# Patient Record
Sex: FEMALE | Race: WHITE | URBAN - METROPOLITAN AREA
[De-identification: names, ages, dates, MRNs, and addresses within clinical notes are randomized per-mention and may not be internally consistent; named-entity substitution may affect disease eponyms.]

---

## 2017-06-20 ENCOUNTER — TRANSFERRED RECORDS (OUTPATIENT)
Dept: HEALTH INFORMATION MANAGEMENT | Facility: CLINIC | Age: 37
End: 2017-06-20

## 2017-06-20 LAB
HPV ABSTRACT: NORMAL
HPV ABSTRACT: NORMAL
PAP-ABSTRACT: NORMAL

## 2018-01-15 ENCOUNTER — OFFICE VISIT (OUTPATIENT)
Dept: FAMILY MEDICINE | Facility: CLINIC | Age: 38
End: 2018-01-15
Payer: COMMERCIAL

## 2018-01-15 VITALS
TEMPERATURE: 98.9 F | RESPIRATION RATE: 16 BRPM | OXYGEN SATURATION: 98 % | SYSTOLIC BLOOD PRESSURE: 124 MMHG | BODY MASS INDEX: 32.45 KG/M2 | HEIGHT: 66 IN | DIASTOLIC BLOOD PRESSURE: 80 MMHG | HEART RATE: 96 BPM | WEIGHT: 201.9 LBS

## 2018-01-15 DIAGNOSIS — R30.0 DYSURIA: Primary | ICD-10-CM

## 2018-01-15 DIAGNOSIS — Z11.3 SCREENING EXAMINATION FOR VENEREAL DISEASE: ICD-10-CM

## 2018-01-15 LAB
ALBUMIN UR-MCNC: NEGATIVE MG/DL
APPEARANCE UR: CLEAR
BACTERIA #/AREA URNS HPF: ABNORMAL /HPF
BILIRUB UR QL STRIP: NEGATIVE
COLOR UR AUTO: YELLOW
GLUCOSE UR STRIP-MCNC: NEGATIVE MG/DL
HGB UR QL STRIP: ABNORMAL
KETONES UR STRIP-MCNC: NEGATIVE MG/DL
LEUKOCYTE ESTERASE UR QL STRIP: NEGATIVE
NITRATE UR QL: NEGATIVE
NON-SQ EPI CELLS #/AREA URNS LPF: ABNORMAL /LPF
PH UR STRIP: 7 PH (ref 5–7)
RBC #/AREA URNS AUTO: ABNORMAL /HPF
SOURCE: ABNORMAL
SP GR UR STRIP: 1.01 (ref 1–1.03)
SPECIMEN SOURCE: NORMAL
UROBILINOGEN UR STRIP-ACNC: 0.2 EU/DL (ref 0.2–1)
WBC #/AREA URNS AUTO: ABNORMAL /HPF
WET PREP SPEC: NORMAL

## 2018-01-15 PROCEDURE — 81001 URINALYSIS AUTO W/SCOPE: CPT | Performed by: PHYSICIAN ASSISTANT

## 2018-01-15 PROCEDURE — 87591 N.GONORRHOEAE DNA AMP PROB: CPT | Performed by: PHYSICIAN ASSISTANT

## 2018-01-15 PROCEDURE — 87389 HIV-1 AG W/HIV-1&-2 AB AG IA: CPT | Performed by: PHYSICIAN ASSISTANT

## 2018-01-15 PROCEDURE — 86780 TREPONEMA PALLIDUM: CPT | Performed by: PHYSICIAN ASSISTANT

## 2018-01-15 PROCEDURE — 87210 SMEAR WET MOUNT SALINE/INK: CPT | Performed by: PHYSICIAN ASSISTANT

## 2018-01-15 PROCEDURE — 99213 OFFICE O/P EST LOW 20 MIN: CPT | Performed by: PHYSICIAN ASSISTANT

## 2018-01-15 PROCEDURE — 36415 COLL VENOUS BLD VENIPUNCTURE: CPT | Performed by: PHYSICIAN ASSISTANT

## 2018-01-15 PROCEDURE — 87491 CHLMYD TRACH DNA AMP PROBE: CPT | Performed by: PHYSICIAN ASSISTANT

## 2018-01-15 RX ORDER — LAMOTRIGINE 200 MG/1
TABLET ORAL
Refills: 5 | COMMUNITY
Start: 2018-01-03

## 2018-01-15 RX ORDER — TOPIRAMATE 25 MG/1
50 TABLET, FILM COATED ORAL
COMMUNITY
Start: 2017-12-27

## 2018-01-15 RX ORDER — ERGOCALCIFEROL 1.25 MG/1
CAPSULE, LIQUID FILLED ORAL
COMMUNITY
Start: 2017-11-08

## 2018-01-15 RX ORDER — VENLAFAXINE HYDROCHLORIDE 150 MG/1
150 CAPSULE, EXTENDED RELEASE ORAL
COMMUNITY
Start: 2017-06-22

## 2018-01-15 RX ORDER — ALPRAZOLAM 0.5 MG
TABLET ORAL
COMMUNITY
Start: 2017-10-20

## 2018-01-15 ASSESSMENT — PATIENT HEALTH QUESTIONNAIRE - PHQ9
SUM OF ALL RESPONSES TO PHQ QUESTIONS 1-9: 15
SUM OF ALL RESPONSES TO PHQ QUESTIONS 1-9: 15
10. IF YOU CHECKED OFF ANY PROBLEMS, HOW DIFFICULT HAVE THESE PROBLEMS MADE IT FOR YOU TO DO YOUR WORK, TAKE CARE OF THINGS AT HOME, OR GET ALONG WITH OTHER PEOPLE: VERY DIFFICULT

## 2018-01-15 ASSESSMENT — ANXIETY QUESTIONNAIRES
GAD7 TOTAL SCORE: 7
4. TROUBLE RELAXING: NOT AT ALL
1. FEELING NERVOUS, ANXIOUS, OR ON EDGE: MORE THAN HALF THE DAYS
2. NOT BEING ABLE TO STOP OR CONTROL WORRYING: SEVERAL DAYS
5. BEING SO RESTLESS THAT IT IS HARD TO SIT STILL: NOT AT ALL
6. BECOMING EASILY ANNOYED OR IRRITABLE: NEARLY EVERY DAY
7. FEELING AFRAID AS IF SOMETHING AWFUL MIGHT HAPPEN: NOT AT ALL
3. WORRYING TOO MUCH ABOUT DIFFERENT THINGS: SEVERAL DAYS
7. FEELING AFRAID AS IF SOMETHING AWFUL MIGHT HAPPEN: NOT AT ALL
GAD7 TOTAL SCORE: 7
GAD7 TOTAL SCORE: 7

## 2018-01-15 ASSESSMENT — PAIN SCALES - GENERAL: PAINLEVEL: NO PAIN (0)

## 2018-01-15 NOTE — PROGRESS NOTES
"  SUBJECTIVE:                                                    Christina Mcmahon is a 37 year old female who presents to clinic today for the following health issues:          History of Present Illness   Frequency of exercise:  1 day/week  Duration of exercise:  15-30 minutes  Taking medications regularly:  Yes  Medication side effects:  None  Additional concerns today:  No        STD Check , Patient states she has no symptoms,  but her partner had dysuria. She also stated she had chlamydia twice in the past.     New male partner with for 1 month. She had negative STD testing prior to this partner. He has had dysuria- he has not been seen. She wanted to get checked b/c has had chlamydia in the past- 3 yrs ago and 2.5yrs ago.   Not using condoms.   Contraception- mirena. No LMP recorded. Patient is not currently having periods (Reason: IUD). never any bleeding or spotting.   No vaginal discharge.  No pain with intercourse or pelvic pain.   No sores in the genital area, lumps, bumps.   \"Little bit\" of dysuria at start of urine stream. But has had a few kids and attributes to that. Some frequency of urination.   At times some urinary incontinence with    Pap at [facility]- pt states is up to date.   No recent abx. Pt states not prone to BV or yeast.       Problem list and histories reviewed & adjusted, as indicated.  Additional history: as documented      Patient Active Problem List   Diagnosis     Sphincter of Oddi dysfunction     History reviewed. No pertinent surgical history.    Social History   Substance Use Topics     Smoking status: Current Some Day Smoker     Smokeless tobacco: Never Used      Comment: 1-3 cigs daily      Alcohol use Yes      Comment: socail      Family History   Problem Relation Age of Onset     Hypertension Father          Current Outpatient Prescriptions   Medication Sig Dispense Refill     ALPRAZolam (XANAX) 0.5 MG tablet as needed.       lamoTRIgine (LAMICTAL) 200 MG tablet TAKE 1 TABLET " "DAILY.  5     vitamin D (ERGOCALCIFEROL) 94250 UNIT capsule Take one capsule once a week for 8 weeks.       aspirin-acetaminophen-caffeine (EXCEDRIN MIGRAINE) 250-250-65 MG per tablet Take 1 tablet by mouth       metFORMIN (GLUCOPHAGE) 500 MG tablet TAKE 1 TABLET BY MOUTH 2 TIMES DAILY WITH MEALS.  0     venlafaxine (EFFEXOR-XR) 150 MG 24 hr capsule Take 150 mg by mouth       topiramate (TOPAMAX) 25 MG tablet Take 50 mg by mouth       Acetaminophen (TYLENOL PO)        HYDROcodone-acetaminophen (NORCO) 5-325 MG per tablet Take 1 tablet by mouth every 6 hours as needed for moderate to severe pain (Patient not taking: Reported on 1/15/2018) 15 tablet 0     FLUOXETINE HCL PO        Allergies   Allergen Reactions     Codeine Nausea and Vomiting     BP Readings from Last 3 Encounters:   01/15/18 124/80   05/03/15 (!) 137/95   06/17/12 124/85    Wt Readings from Last 3 Encounters:   01/15/18 201 lb 14.4 oz (91.6 kg)   10/14/14 189 lb (85.7 kg)                  Labs reviewed in EPIC        ROS:  Constitutional, HEENT, cardiovascular, pulmonary, gi and gu systems are negative, except as otherwise noted.      OBJECTIVE:   /80  Pulse 96  Temp 98.9  F (37.2  C) (Temporal)  Resp 16  Ht 5' 6\" (1.676 m)  Wt 201 lb 14.4 oz (91.6 kg)  SpO2 98%  BMI 32.59 kg/m2  Body mass index is 32.59 kg/(m^2).  GENERAL: healthy, alert and no distress  EYES: Eyes grossly normal to inspection, PERRL and conjunctivae and sclerae normal  NECK: no adenopathy, no asymmetry, masses, or scars and thyroid normal to palpation  RESP: lungs clear to auscultation - no rales, rhonchi or wheezes  CV: regular rate and rhythm, normal S1 S2, no S3 or S4, no murmur, click or rub  GI: +BS, soft nontender, no suprapubic tenderness, no CVA TTP, no guarding or rebound  : pt deferred  NEURO: Normal strength and tone, mentation intact and speech normal  PSYCH: mentation appears normal, affect normal/bright    Diagnostic Test Results:  Results for orders " placed or performed in visit on 01/15/18   UA reflex to Microscopic   Result Value Ref Range    Color Urine Yellow     Appearance Urine Clear     Glucose Urine Negative NEG^Negative mg/dL    Bilirubin Urine Negative NEG^Negative    Ketones Urine Negative NEG^Negative mg/dL    Specific Gravity Urine 1.015 1.003 - 1.035    Blood Urine Trace (A) NEG^Negative    pH Urine 7.0 5.0 - 7.0 pH    Protein Albumin Urine Negative NEG^Negative mg/dL    Urobilinogen Urine 0.2 0.2 - 1.0 EU/dL    Nitrite Urine Negative NEG^Negative    Leukocyte Esterase Urine Negative NEG^Negative    Source Midstream Urine    HIV Antigen Antibody Combo   Result Value Ref Range    HIV Antigen Antibody Combo Nonreactive NR^Nonreactive       Anti Treponema   Result Value Ref Range    Treponema pallidum Antibody Negative NEG^Negative   Urine Microscopic   Result Value Ref Range    WBC Urine O - 2 OTO2^O - 2 /HPF    RBC Urine O - 2 OTO2^O - 2 /HPF    Squamous Epithelial /LPF Urine Few FEW^Few /LPF    Bacteria Urine Few (A) NEG^Negative /HPF   Wet prep   Result Value Ref Range    Specimen Description Vagina     Wet Prep No yeast seen     Wet Prep No Trichomonas seen     Wet Prep No clue cells seen    Chlamydia trachomatis PCR   Result Value Ref Range    Specimen Description Vagina     Chlamydia Trachomatis PCR Negative NEG^Negative   Neisseria gonorrhoeae PCR   Result Value Ref Range    Specimen Descrip Vagina     N Gonorrhea PCR Negative NEG^Negative         ASSESSMENT/PLAN:       1. Dysuria  Pt not concerned for UTI, but UA w/micro obtained clean catch.   Self-swab for wet prep and CG/chlamydia.   Push fluids, follow up if worsening UTI like sx.   - Wet prep  - UA reflex to Microscopic  - Urine Microscopic    2. Screening examination for venereal disease  Safe sex practices discussed and encouraged. STD screening as below. Encourage partner to have testing since he is reporting sx.   - Chlamydia trachomatis PCR  - Neisseria gonorrhoeae PCR  - HIV  Antigen Antibody Combo  - Anti Treponema    Follow Up: For worsening symptoms (ie new fevers, worsening pain, etc), non-improvement as expected/discussed, questions regarding your medications or treatment plan. Discussed parameters for follow up and included in After Visit Summary given to patient.      Aysha Noriega PA-C  [facility]

## 2018-01-15 NOTE — PATIENT INSTRUCTIONS
I will call you with results.     Chlamydia, gonorrhea, HIV, syphilis, wet prep (vaginal swab for yeast/bacteria) and urine to eval for UTI

## 2018-01-15 NOTE — MR AVS SNAPSHOT
"              After Visit Summary   1/15/2018    Christina Mcmahon    MRN: [id number]           Patient Information     Date Of Birth          1980        Visit Information        Provider Department      1/15/2018 10:40 AM Aysha Noriega PA-C [facility]        Today's Diagnoses     Dysuria    -  1    Screening examination for venereal disease          Care Instructions    I will call you with results.     Chlamydia, gonorrhea, HIV, syphilis, wet prep (vaginal swab for yeast/bacteria) and urine to eval for UTI           Follow-ups after your visit        Your next 10 appointments already scheduled     Mar 15, 2018  9:00 AM CDT   Return Visit with Christina Ortiz MD   [facility] ([facility])    [id number] [place] [id number] [phone]              Who to contact     If you have questions or need follow up information about today's clinic visit or your schedule please contact [facility] directly at [phone].  Normal or non-critical lab and imaging results will be communicated to you by Chore[employer]hart, letter or phone within 4 business days after the clinic has received the results. If you do not hear from us within 7 days, please contact the clinic through Chore[employer] or phone. If you have a critical or abnormal lab result, we will notify you by phone as soon as possible.  Submit refill requests through [employer] or call your pharmacy and they will forward the refill request to us. Please allow 3 business days for your refill to be completed.          Additional Information About Your Visit        [employer] Information     [employer] lets you send messages to your doctor, view your test results, renew your prescriptions, schedule appointments and more. To sign up, go to [email or web address] . Click on \"Log in\" on the left side of the screen, which will take you to the Welcome page. Then click on \"Sign up Now\" on the right side of the page. " "    You will be asked to enter the access code listed below, as well as some personal information. Please follow the directions to create your username and password.     Your access code is: [email or web address]  Expires: 4/15/2018 11:39 AM     Your access code will  in 90 days. If you need help or a new code, please call your [place] clinic or [phone].        Care EveryWhere ID     This is your Care EveryWhere ID. This could be used by other organizations to access your [place] medical records  [id number]        Your Vitals Were     Pulse Temperature Respirations Height Pulse Oximetry BMI (Body Mass Index)    96 98.9  F (37.2  C) (Temporal) 16 5' 6\" (1.676 m) 98% 32.59 kg/m2       Blood Pressure from Last 3 Encounters:   01/15/18 124/80   05/03/15 (!) 137/95   12 124/85    Weight from Last 3 Encounters:   01/15/18 201 lb 14.4 oz (91.6 kg)   10/14/14 189 lb (85.7 kg)              We Performed the Following     Anti Treponema     Chlamydia trachomatis PCR     HIV Antigen Antibody Combo     Neisseria gonorrhoeae PCR     UA reflex to Microscopic     Wet prep        Primary Care Provider Office Phone # Fax #    Jaime Jacques [phone]       [facility] [address] [place] [address]        Equal Access to Services     [facility]: Hadii aad ku hadasho Soomaali, waaxda luqadaha, qaybta kaalmada adeegyada, yovany andrade . So [facility] [phone].    ATENCIÓN: Si habla español, tiene a munoz disposición servicios gratuitos de asistencia lingüística. Romy al [phone].    We comply with applicable federal civil rights laws and [place] laws. We do not discriminate on the basis of race, color, national origin, age, disability, sex, sexual orientation, or gender identity.            Thank you!     Thank you for choosing [facility]  for your care. Our goal is always to provide you with excellent care. Hearing back from our " patients is one way we can continue to improve our services. Please take a few minutes to complete the written survey that you may receive in the mail after your visit with us. Thank you!             Your Updated Medication List - Protect others around you: Learn how to safely use, store and throw away your medicines at www.disposemymeds.org.          This list is accurate as of: 1/15/18 11:39 AM.  Always use your most recent med list.                   Brand Name Dispense Instructions for use Diagnosis    ALPRAZolam 0.5 MG tablet    XANAX     as needed.        aspirin-acetaminophen-caffeine 250-250-65 MG per tablet    EXCEDRIN MIGRAINE     Take 1 tablet by mouth        FLUOXETINE HCL PO           HYDROcodone-acetaminophen 5-325 MG per tablet    NORCO    15 tablet    Take 1 tablet by mouth every 6 hours as needed for moderate to severe pain        lamoTRIgine 200 MG tablet    LaMICtal     TAKE 1 TABLET DAILY.        metFORMIN 500 MG tablet    GLUCOPHAGE     TAKE 1 TABLET BY MOUTH 2 TIMES DAILY WITH MEALS.        topiramate 25 MG tablet    TOPAMAX     Take 50 mg by mouth        TYLENOL PO           venlafaxine 150 MG 24 hr capsule    EFFEXOR-XR     Take 150 mg by mouth        vitamin D 05934 UNIT capsule    ERGOCALCIFEROL     Take one capsule once a week for 8 weeks.

## 2018-01-15 NOTE — NURSING NOTE
"Chief Complaint   Patient presents with     STD     Panel Management     flu shot, tetanus, pap       Initial /80  Pulse 96  Temp 98.9  F (37.2  C) (Temporal)  Resp 16  Ht 5' 6\" (1.676 m)  Wt 201 lb 14.4 oz (91.6 kg)  SpO2 98%  BMI 32.59 kg/m2 Estimated body mass index is 32.59 kg/(m^2) as calculated from the following:    Height as of this encounter: 5' 6\" (1.676 m).    Weight as of this encounter: 201 lb 14.4 oz (91.6 kg).  Medication Reconciliation: complete     Cally Maldonado MA       "

## 2018-01-16 LAB
C TRACH DNA SPEC QL NAA+PROBE: NEGATIVE
HIV 1+2 AB+HIV1 P24 AG SERPL QL IA: NONREACTIVE
N GONORRHOEA DNA SPEC QL NAA+PROBE: NEGATIVE
SPECIMEN SOURCE: NORMAL
SPECIMEN SOURCE: NORMAL
T PALLIDUM IGG+IGM SER QL: NEGATIVE

## 2018-01-16 ASSESSMENT — PATIENT HEALTH QUESTIONNAIRE - PHQ9: SUM OF ALL RESPONSES TO PHQ QUESTIONS 1-9: 15

## 2018-01-16 ASSESSMENT — ANXIETY QUESTIONNAIRES: GAD7 TOTAL SCORE: 7

## 2018-02-16 ENCOUNTER — TELEPHONE (OUTPATIENT)
Dept: FAMILY MEDICINE | Facility: CLINIC | Age: 38
End: 2018-02-16

## 2018-02-16 NOTE — TELEPHONE ENCOUNTER
Please abstract the following data from this visit with this patient into the appropriate field in Epic:    Pap smear done on this date: 06/20/2017 (approximately), by this group: [facility], results in care everywhere.    Tayla Sanchez    Panel Management Review      Patient has the following on her problem list: None      Composite cancer screening  Chart review shows that this patient is due/due soon for the following Pap Smear  Summary:    Patient is due/failing the following:   PAP    Action needed:   Patient needs office visit for PAP.    Type of outreach:    none per care everywhere UTD    Questions for provider review:    None                                                                                                                                    Tayla Sanchez       Chart routed to Care Team .

## 2018-03-07 ENCOUNTER — VIRTUAL VISIT (OUTPATIENT)
Dept: FAMILY MEDICINE | Facility: CLINIC | Age: 38
End: 2018-03-07
Payer: COMMERCIAL

## 2018-03-07 DIAGNOSIS — Z53.9 ERRONEOUS ENCOUNTER--DISREGARD: ICD-10-CM

## 2018-03-07 DIAGNOSIS — Z11.3 SCREEN FOR STD (SEXUALLY TRANSMITTED DISEASE): Primary | ICD-10-CM

## 2018-03-07 LAB
SPECIMEN SOURCE: NORMAL
WET PREP SPEC: NORMAL

## 2018-03-07 PROCEDURE — 87591 N.GONORRHOEAE DNA AMP PROB: CPT | Performed by: FAMILY MEDICINE

## 2018-03-07 PROCEDURE — 87491 CHLMYD TRACH DNA AMP PROBE: CPT | Performed by: FAMILY MEDICINE

## 2018-03-07 PROCEDURE — 87210 SMEAR WET MOUNT SALINE/INK: CPT | Performed by: FAMILY MEDICINE

## 2018-03-07 NOTE — PROGRESS NOTES
"Christina Mcmahon is a 38 year old female who is being evaluated via a telephone visit.      The patient has been notified of following:     \"This telephone visit will be conducted via a call between you and your physician/provider. We have found that certain health care needs can be provided without the need for a physical exam.  This service lets us provide the care you need with a short phone conversation.  If a prescription is necessary we can send it directly to your pharmacy.  If lab work is needed we can place an order for that and you can then stop by our lab to have the test done at a later time.    We will bill your insurance company for this service.  Please check with your medical insurance if this type of visit is covered. You may be responsible for the cost of this type of visit if insurance coverage is denied.  The typical cost is $30 (10min), $59 (11-20min) and $85 (21-30min).  Most often these visits are shorter than 10 minutes.    If during the course of the call the physician/provider feels a telephone visit is not appropriate, you will not be charged for this service.\"       Consent has been obtained for this service by care team member: yes.   See the scanned image in the medical record.    Christina Mcmahon complains of  STD (LM message for pt )      I have reviewed and updated the patient's Past Medical History, Social History, Family History and Medication List.    ALLERGIES  Tiffani Maldonado MA         Additional provider notes: ***    Assessment/Plan:  No diagnosis found.    I have reviewed the note as documented above.  This accurately captures the substance of my conversation with the patient,  ***    Total time of call between patient and provider was *** minutes           "

## 2018-03-07 NOTE — PROGRESS NOTES
Called patient. No answer. Left message to return call. See outgoing call documentation.     This encounter was opened in error. Please disregard.

## 2018-03-07 NOTE — MR AVS SNAPSHOT
After Visit Summary   3/7/2018    Christina Mcmahon    MRN: [id number]           Patient Information     Date Of Birth          1980        Visit Information        Provider Department      3/7/2018 12:00 PM Rebeca Macdonald MD [facility]        Today's Diagnoses     Screen for STD (sexually transmitted disease)    -  1    ERRONEOUS ENCOUNTER--DISREGARD           Follow-ups after your visit        Your next 10 appointments already scheduled     Mar 09, 2018 12:40 PM CST   [facility]    [address]  [facility] [id number] [phone]           Note: this is not an onsite visit; there is no need to come to the facility.            Mar 15, 2018  9:00 AM CDT   Return Visit with Christina Ortiz MD   [facility]    [address] [place] [address] [phone]              Who to contact     If you have questions or need follow up information about today's clinic visit or your schedule please contact [facility] directly at [phone].  Normal or non-critical lab and imaging results will be communicated to you by MyChart, letter or phone within 4 business days after the clinic has received the results. If you do not hear from us within 7 days, please contact the clinic through MyChart or phone. If you have a critical or abnormal lab result, we will notify you by phone as soon as possible.  Submit refill requests through [employer] or call your pharmacy and they will forward the refill request to us. Please allow 3 business days for your refill to be completed.          Additional Information About Your Visit        [facility] Information     [employer] lets you send messages to your doctor, view your test results, renew your prescriptions, schedule appointments and more. To sign up, go to [email or web address] . Click on  "\"Log in\" on the left side of the screen, which will take you to the Welcome page. Then click on \"Sign up Now\" on the right side of the page.     You will be asked to enter the access code listed below, as well as some personal information. Please follow the directions to create your username and password.     Your access code is: [email or web address]  Expires: 4/15/2018 11:39 AM     Your access code will  in 90 days. If you need help or a new code, please call your [place] clinic or [phone].        Care EveryWhere ID     This is your Care EveryWhere ID. This could be used by other organizations to access your [place] medical records  [id number]         Blood Pressure from Last 3 Encounters:   01/15/18 124/80   05/03/15 (!) 137/95   12 124/85    Weight from Last 3 Encounters:   01/15/18 201 lb 14.4 oz (91.6 kg)   10/14/14 189 lb (85.7 kg)              We Performed the Following     Chlamydia trachomatis PCR     Neisseria gonorrhoeae PCR     Wet prep        Primary Care Provider Office Phone # Fax #    Jaime Jacques [phone]       [facility] [address] [place] [address]        Equal Access to Services     ALETHEA HOLLY : Hadii aad ku hadasho Soomaali, waaxda luqadaha, qaybta kaalmada adeegyada, waxay idiin haydonavonn honey andrade . So [facility] [phone].    ATENCIÓN: Si habla español, tiene a munoz disposición servicios gratuitos de asistencia lingüística. Llame al [phone].    We comply with applicable federal civil rights laws and [place] laws. We do not discriminate on the basis of race, color, national origin, age, disability, sex, sexual orientation, or gender identity.            Thank you!     Thank you for choosing [facility]  for your care. Our goal is always to provide you with excellent care. Hearing back from our patients is one way we can continue to improve our services. Please take a few minutes to complete the written " survey that you may receive in the mail after your visit with us. Thank you!             Your Updated Medication List - Protect others around you: Learn how to safely use, store and throw away your medicines at www.disposemymeds.org.          This list is accurate as of 3/7/18 11:59 PM.  Always use your most recent med list.                   Brand Name Dispense Instructions for use Diagnosis    ALPRAZolam 0.5 MG tablet    XANAX     as needed.        aspirin-acetaminophen-caffeine 250-250-65 MG per tablet    EXCEDRIN MIGRAINE     Take 1 tablet by mouth        FLUOXETINE HCL PO           HYDROcodone-acetaminophen 5-325 MG per tablet    NORCO    15 tablet    Take 1 tablet by mouth every 6 hours as needed for moderate to severe pain        lamoTRIgine 200 MG tablet    LaMICtal     TAKE 1 TABLET DAILY.        metFORMIN 500 MG tablet    GLUCOPHAGE     TAKE 1 TABLET BY MOUTH 2 TIMES DAILY WITH MEALS.        topiramate 25 MG tablet    TOPAMAX     Take 50 mg by mouth        TYLENOL PO           venlafaxine 150 MG 24 hr capsule    EFFEXOR-XR     Take 150 mg by mouth        vitamin D 42492 UNIT capsule    ERGOCALCIFEROL     Take one capsule once a week for 8 weeks.

## 2018-03-09 ENCOUNTER — VIRTUAL VISIT (OUTPATIENT)
Dept: FAMILY MEDICINE | Facility: CLINIC | Age: 38
End: 2018-03-09
Payer: COMMERCIAL

## 2018-03-09 DIAGNOSIS — Z11.3 SCREEN FOR STD (SEXUALLY TRANSMITTED DISEASE): Primary | ICD-10-CM

## 2018-03-09 LAB
C TRACH DNA SPEC QL NAA+PROBE: NEGATIVE
N GONORRHOEA DNA SPEC QL NAA+PROBE: NEGATIVE
SPECIMEN SOURCE: NORMAL
SPECIMEN SOURCE: NORMAL

## 2018-03-09 PROCEDURE — 99441 ZZC PHYSICIAN TELEPHONE EVALUATION 5-10 MIN: CPT | Performed by: FAMILY MEDICINE

## 2018-03-09 NOTE — MR AVS SNAPSHOT
"              After Visit Summary   3/9/2018    Christina Mcmahon    MRN: [id number]           Patient Information     Date Of Birth          1980        Visit Information        Provider Department      3/9/2018 12:40 PM Rebeca Macdonald MD [facility]        Today's Diagnoses     Screen for STD (sexually transmitted disease)    -  1       Follow-ups after your visit        Your next 10 appointments already scheduled     Mar 15, 2018  9:00 AM CDT   Return Visit with Christina Ortiz MD   [facility] ([facility])    [address] [place] [id number] [phone]              Who to contact     If you have questions or need follow up information about today's clinic visit or your schedule please contact [facility] directly at [phone].  Normal or non-critical lab and imaging results will be communicated to you by MyChart, letter or phone within 4 business days after the clinic has received the results. If you do not hear from us within 7 days, please contact the clinic through MyChart or phone. If you have a critical or abnormal lab result, we will notify you by phone as soon as possible.  Submit refill requests through [employer] or call your pharmacy and they will forward the refill request to us. Please allow 3 business days for your refill to be completed.          Additional Information About Your Visit        MyChart Information     [employer] lets you send messages to your doctor, view your test results, renew your prescriptions, schedule appointments and more. To sign up, go to [email or web address] . Click on \"Log in\" on the left side of the screen, which will take you to the Welcome page. Then click on \"Sign up Now\" on the right side of the page.     You will be asked to enter the access code listed below, as well as some personal information. Please follow the directions to create your username and password.     Your access code " is: [email or web address]  Expires: 4/15/2018 11:39 AM     Your access code will  in 90 days. If you need help or a new code, please call your [place] clinic or [phone].        Care EveryWhere ID     This is your Care EveryWhere ID. This could be used by other organizations to access your [place] medical records  [id number]         Blood Pressure from Last 3 Encounters:   01/15/18 124/80   05/03/15 (!) 137/95   12 124/85    Weight from Last 3 Encounters:   01/15/18 201 lb 14.4 oz (91.6 kg)   10/14/14 189 lb (85.7 kg)              Today, you had the following     No orders found for display       Primary Care Provider Office Phone # Fax #    Jaime Jacques [phone]       [facility] [address] [place] [address]        Equal Access to Services     [facility] : Hadii aad ku hadasho Soomaali, waaxda luqadaha, qaybta kaalmada adeegyada, yovany del cid haytrudy andrade . So [facility] [phone].    ATENCIÓN: Si habla español, tiene a munoz disposición servicios gratuitos de asistencia lingüística. Llame al [phone].    We comply with applicable federal civil rights laws and [place] laws. We do not discriminate on the basis of race, color, national origin, age, disability, sex, sexual orientation, or gender identity.            Thank you!     Thank you for choosing [facility]  for your care. Our goal is always to provide you with excellent care. Hearing back from our patients is one way we can continue to improve our services. Please take a few minutes to complete the written survey that you may receive in the mail after your visit with us. Thank you!             Your Updated Medication List - Protect others around you: Learn how to safely use, store and throw away your medicines at www.disposemymeds.org.          This list is accurate as of 3/9/18  5:25 PM.  Always use your most recent med list.                   Brand Name Dispense  Instructions for use Diagnosis    ALPRAZolam 0.5 MG tablet    XANAX     as needed.        aspirin-acetaminophen-caffeine 250-250-65 MG per tablet    EXCEDRIN MIGRAINE     Take 1 tablet by mouth        FLUOXETINE HCL PO           lamoTRIgine 200 MG tablet    LaMICtal     TAKE 1 TABLET DAILY.        metFORMIN 500 MG tablet    GLUCOPHAGE     TAKE 1 TABLET BY MOUTH 2 TIMES DAILY WITH MEALS.        MIRENA (52 MG) 20 MCG/24HR IUD   Generic drug:  levonorgestrel      1 each by Intrauterine route once        topiramate 25 MG tablet    TOPAMAX     Take 50 mg by mouth        TYLENOL PO           venlafaxine 150 MG 24 hr capsule    EFFEXOR-XR     Take 150 mg by mouth        vitamin D 84329 UNIT capsule    ERGOCALCIFEROL     Take one capsule once a week for 8 weeks.

## 2018-03-09 NOTE — PROGRESS NOTES
"Christina Mcmahon is a 38 year old female who is being evaluated via a telephone visit.      The patient has been notified of following:     \"This telephone visit will be conducted via a call between you and your physician/provider. We have found that certain health care needs can be provided without the need for a physical exam.  This service lets us provide the care you need with a short phone conversation.  If a prescription is necessary we can send it directly to your pharmacy.  If lab work is needed we can place an order for that and you can then stop by our lab to have the test done at a later time.    We will bill your insurance company for this service.  Please check with your medical insurance if this type of visit is covered. You may be responsible for the cost of this type of visit if insurance coverage is denied.  The typical cost is $30 (10min), $59 (11-20min) and $85 (21-30min).  Most often these visits are shorter than 10 minutes.    If during the course of the call the physician/provider feels a telephone visit is not appropriate, you will not be charged for this service.\"       Consent has been obtained for this service by care team member: yes.   See the scanned image in the medical record.    Christina Mcmahon complains of  STD      I have reviewed and updated the patient's Past Medical History, Social History, Family History and Medication List.    ALLERGIES  Codeine    Gideon Christian MA   (MA signature)    Additional provider notes: spoke with Christina. \"I made poor decisions last weekend\". Sexual encounter without protection. \"I just don't often use protection\".  No burning or bleeding or itching. No recent STDs. No abdominal pain or fevers.   Has Mirena IUD. Has had STDs in the past but not for a few years.     Assessment/Plan:    (Z11.3) Screen for STD (sexually transmitted disease)  (primary encounter diagnosis)  Comment: reviewed results of her wet prep and GC/Chlamydia - all negative. Offered her to " have HIV testing, RPR, Hepatitis screening. She declines to do these. Recommend condoms.   Plan: as above.           Total time of call between patient and provider was 5 minutes

## 2018-03-15 ENCOUNTER — OFFICE VISIT (OUTPATIENT)
Dept: DERMATOLOGY | Facility: CLINIC | Age: 38
End: 2018-03-15
Payer: COMMERCIAL

## 2018-03-15 DIAGNOSIS — D22.9 MULTIPLE BENIGN NEVI: ICD-10-CM

## 2018-03-15 DIAGNOSIS — R23.8 PAPULE: ICD-10-CM

## 2018-03-15 DIAGNOSIS — D23.9 DERMATOFIBROMA: Primary | ICD-10-CM

## 2018-03-15 PROCEDURE — 88305 TISSUE EXAM BY PATHOLOGIST: CPT | Performed by: DERMATOLOGY

## 2018-03-15 PROCEDURE — 99202 OFFICE O/P NEW SF 15 MIN: CPT | Mod: 25 | Performed by: DERMATOLOGY

## 2018-03-15 PROCEDURE — 11300 SHAVE SKIN LESION 0.5 CM/<: CPT | Performed by: DERMATOLOGY

## 2018-03-15 ASSESSMENT — PAIN SCALES - GENERAL: PAINLEVEL: NO PAIN (0)

## 2018-03-15 NOTE — MR AVS SNAPSHOT
After Visit Summary   3/15/2018    Christina Mcmahon    MRN: [id number]           Patient Information     Date Of Birth          1980        Visit Information        Provider Department      3/15/2018 9:00 AM Christina Ortiz MD [facility]        Today's Diagnoses     Dermatofibroma    -  1    Papule        Multiple benign nevi          Care Instructions    Wound Care After a Biopsy    What is a skin biopsy?  A skin biopsy allows the doctor to examine a very small piece of tissue under the microscope to determine the diagnosis and the best treatment for the skin condition. A local anesthetic (numbing medicine)  is injected with a very small needle into the skin area to be tested. A small piece of skin is taken from the area. Sometimes a suture (stitch) is used.     What are the risks of a skin biopsy?  I will experience scar, bleeding, swelling, pain, crusting and redness. I may experience incomplete removal or recurrence. Risks of this procedure are excessive bleeding, bruising, infection, nerve damage, numbness, thick (hypertrophic or keloidal) scar and non-diagnostic biopsy.    How should I care for my wound for the first 24 hours?    Keep the wound dry and covered for 24 hours    If it bleeds, hold direct pressure on the area for 15 minutes. If bleeding does not stop then go to the emergency room    Avoid strenuous exercise the first 1-2 days or as your doctor instructs you    How should I care for the wound after 24 hours?    After 24 hours, remove the bandage    You may bathe or shower as normal    If you had a scalp biopsy, you can shampoo as usual and can use shower water to clean the biopsy site daily    Clean the wound twice a day with gentle soap and water    Do not scrub, be gentle    Apply white petroleum/Vaseline after cleaning the wound with a cotton swab or a clean finger, and keep the site covered with a Bandaid /bandage. Bandages are not necessary with a scalp  biopsy    If you are unable to cover the site with a Bandaid /bandage, re-apply ointment 2-3 times a day to keep the site moist. Moisture will help with healing    Avoid strenuous activity for first 1-2 days    Avoid lakes, rivers, pools, and oceans until the stitches are removed or the site is healed    How do I clean my wound?    Wash hands thoroughly with soap or use hand  before all wound care    Clean the wound with gentle soap and water    Apply white petroleum/Vaseline  to wound after it is clean    Replace the Bandaid /bandage to keep the wound covered for the first few days or as instructed by your doctor    If you had a scalp biopsy, warm shower water to the area on a daily basis should suffice    What should I use to clean my wound?     Cotton-tipped applicators (Qtips )    White petroleum jelly (Vaseline ). Use a clean new container and use Q-tips to apply.    Bandaids   as needed    Gentle soap     How should I care for my wound long term?    Do not get your wound dirty    Keep up with wound care for one week or until the area is healed.    A small scab will form and fall off by itself when the area is completely healed. The area will be red and will become pink in color as it heals. Sun protection is very important for how your scar will turn out. Sunscreen with an SPF 30 or greater is recommended once the area is healed..    You should have some soreness but it should be mild and slowly go away over several days. Talk to your doctor about using tylenol for pain,    When should I call my doctor?  If you have increased:     Pain or swelling    Pus or drainage (clear or slightly yellow drainage is ok)    Temperature over 100F    Spreading redness or warmth around wound    When will I hear about my results?  The biopsy results can take 2-3 weeks to come back. The clinic will call you with the results, send you a [employer] message, or have you schedule a follow-up clinic or phone time to discuss the  results. Contact our clinics if you do not hear from us in 3 weeks.     Who should I call with questions?    [facility]: [phone]     [facility]: [phone]    For urgent needs outside of business hours call the [facility] at [phone] and ask for the dermatology resident on call              Follow-ups after your visit        Follow-up notes from your care team     Return for excision of DF on left medial knee-45 minutes, most likely 6mm punch and shave on nose.      Who to contact     If you have questions or need follow up information about today's clinic visit or your schedule please contact [facility] directly at [phone].  Normal or non-critical lab and imaging results will be communicated to you by [employer], letter or phone within 4 business days after the clinic has received the results. If you do not hear from us within 7 days, please contact the clinic through MyChart or phone. If you have a critical or abnormal lab result, we will notify you by phone as soon as possible.  Submit refill requests through [employer] or call your pharmacy and they will forward the refill request to us. Please allow 3 business days for your refill to be completed.          Additional Information About Your Visit        [employer] Information     [employer] is an electronic gateway that provides easy, online access to your medical records. With [employer] Messenger, you can request a clinic appointment, read your test results, renew a prescription or communicate with your care team.     To sign up for [employer] visit the website at [email or web address]   You will be asked to enter the access code listed below, as well as some personal information. Please follow the directions to create your username and password.     Your access code is: [email or web address]  Expires: 4/15/2018 12:39 PM     Your access code will  in 90 days. If you need help or a  new code, please contact your [facility] or call [phone] for assistance.        Care EveryWhere ID     This is your Care EveryWhere ID. This could be used by other organizations to access your [place] medical records  [id number]         Blood Pressure from Last 3 Encounters:   01/15/18 124/80   05/03/15 (!) 137/95   06/17/12 124/85    Weight from Last 3 Encounters:   01/15/18 91.6 kg (201 lb 14.4 oz)   10/14/14 85.7 kg (189 lb)              We Performed the Following     SHAV SKIN LESION TRUNK/ARM/LEG <=0.5 CM     Surgical pathology exam        Primary Care Provider Office Phone # Fax #    Jaime Jacques [phone]       [facility] [address] [place] [address]        Equal Access to Services     [facility]: Hadii aad ku hadasho Soomaali, waaxda luqadaha, qaybta kaalmada adeegyada, yovany baughin hayaan honey andrade . So [facility] [phone].    ATENCIÓN: Si habla español, tiene a munoz disposición servicios gratuitos de asistencia lingüística. Romy al [phone].    We comply with applicable federal civil rights laws and [place] laws. We do not discriminate on the basis of race, color, national origin, age, disability, sex, sexual orientation, or gender identity.            Thank you!     Thank you for choosing [facility]  for your care. Our goal is always to provide you with excellent care. Hearing back from our patients is one way we can continue to improve our services. Please take a few minutes to complete the written survey that you may receive in the mail after your visit with us. Thank you!             Your Updated Medication List - Protect others around you: Learn how to safely use, store and throw away your medicines at www.disposemymeds.org.          This list is accurate as of 3/15/18  9:47 AM.  Always use your most recent med list.                   Brand Name Dispense Instructions for use  Diagnosis    ALPRAZolam 0.5 MG tablet    XANAX     as needed.        aspirin-acetaminophen-caffeine 250-250-65 MG per tablet    EXCEDRIN MIGRAINE     Take 1 tablet by mouth        lamoTRIgine 200 MG tablet    LaMICtal     TAKE 1 TABLET DAILY.        MIRENA (52 MG) 20 MCG/24HR IUD   Generic drug:  levonorgestrel      1 each by Intrauterine route once        topiramate 25 MG tablet    TOPAMAX     Take 50 mg by mouth        TYLENOL PO           venlafaxine 150 MG 24 hr capsule    EFFEXOR-XR     Take 150 mg by mouth        vitamin D 63779 UNIT capsule    ERGOCALCIFEROL     Take one capsule once a week for 8 weeks.

## 2018-03-15 NOTE — PROGRESS NOTES
[facility] Dermatology Note      Dermatology Problem List:  1.Dermatofibroma        Encounter Date: Mar 15, 2018    CC:  Chief Complaint   Patient presents with     Derm Problem     New lesion on left upper arm.  Recheck lesion on hip.  Lesion on nose that patient states has grown.         History of Present Illness:  Ms. Christina Mcmahon is a 38 year old female who presents for evaluation for spot on the left upper arm that has changed and is pruritic, a lesion on the left medial knee that catches when she shaves. The patient also has a spot on the lesion on the hip, a lesions on the nose that has grown and is not bleeding, irritated, or otherwise bothersome.      Past Medical History:   Patient Active Problem List   Diagnosis     Sphincter of Oddi dysfunction     Past Medical History:   Diagnosis Date     Depressive disorder      History reviewed. No pertinent surgical history.    Social History:  The patient has several children. Father passed away    Family History:  Her father has skin cancer.     Medications:  Current Outpatient Prescriptions   Medication Sig Dispense Refill     levonorgestrel (MIRENA, 52 MG,) 20 MCG/24HR IUD 1 each by Intrauterine route once       ALPRAZolam (XANAX) 0.5 MG tablet as needed.       lamoTRIgine (LAMICTAL) 200 MG tablet TAKE 1 TABLET DAILY.  5     vitamin D (ERGOCALCIFEROL) 76771 UNIT capsule Take one capsule once a week for 8 weeks.       aspirin-acetaminophen-caffeine (EXCEDRIN MIGRAINE) 250-250-65 MG per tablet Take 1 tablet by mouth       venlafaxine (EFFEXOR-XR) 150 MG 24 hr capsule Take 150 mg by mouth       topiramate (TOPAMAX) 25 MG tablet Take 50 mg by mouth       Acetaminophen (TYLENOL PO)          Allergies   Allergen Reactions     Codeine Nausea and Vomiting       Review of Systems:  -Constitutional: The patient is feeling generally well. No fevers, chills night sweats. No changes in medical problems.   -Skin: As above in HPI. No additional skin  concerns.    Physical exam:  Vitals: There were no vitals taken for this visit.  GEN: This is a well developed, well-nourished female in no acute distress, in a pleasant mood.    SKIN: Total skin excluding the undergarment areas was performed. The exam included the head/face, neck, both arms, chest, back, abdomen, both legs, digits and/or nails. decines gential exam. HAs make up on and declines removal  -approx 5mm skin colored papule on the right nose  -Skin colored pedunculated papule left axilla that is pruritic.approx 3-4mm   -There is a firm tan/flesh colored papule that dimples with lateral pressure on the left medial knee.  -No other lesions of concern on areas examined.       Impression/Plan:    1. Skin colored pedunculated papule left axilla that is pruritic. Hx of pain. NUB. DDx includes nevus vs other    Shave biopsy:  After discussion of benefits and risks including but not limited to bleeding/bruising, pain/swelling, infection, scar, incomplete removal, nerve damage/numbness, recurrence, and non-diagnostic biopsy, written consent, verbal consent and photographs were obtained. Time-out was performed. The area was cleaned with isopropyl alcohol.  was injected to obtain adequate anesthesia of the lesion on the left axilla. 0.5 ml of 1% lidocaine with 1:100,000 epinephrine was injected to obtain adequate anesthesia. A  shave biopsy was performed. Hemostasis was achieved with aluminium chloride. Vaseline and a sterile dressing were applied. The patient tolerated the procedure and no complications were noted. The patient was provided with verbal and written post care instructions.      2. Dermatofibroma, left medial knee. Hx of catching with razor    Referral to Dr. Hayden for excision.   3. Papule consistent with nevus on the right nose    Referral to Dr. Hayden for shave removal if pt desires, this has been growing. She will consider.     4. Multiple clinically benign nevi on the trunk and extremities    No  further intervention required. Patient to report changes.     Recommend sunscreens SPF #30 or greater, protective clothing and avoidance of tanning beds.         Follow-up with Dr. Hayden for excision.    Staff Involved:  Scribe/Staff    Scribe Disclosure:   I, Saskia Sanchez, am serving as a scribe to document services personally performed by Dr. Christina Ortiz, based on data collection and the provider's statements to me.       Provider Disclosure:   The documentation recorded by the scribe accurately reflects the services I personally performed and the decisions made by me.    Christina Ortiz MD    Department of Dermatology  [facility]: Phone: [phone], Fax:[phone]  [facility]: Phone: [phone], Fax: [phone]

## 2018-03-15 NOTE — NURSING NOTE
Dermatology Rooming Note    Christina Mcmahon's goals for this visit include:   Chief Complaint   Patient presents with     Derm Problem     New lesion on left upper arm.  Recheck lesion on hip.  Lesion on nose that patient states has grown.       Is a scribe okay for this visit:YES    Are records needed for this visit(If yes, obtain release of information): Not applicable     Vitals: There were no vitals taken for this visit.    Referring Provider:  Referred Self, MD  No address on file        Christy Santana RN

## 2018-03-15 NOTE — PATIENT INSTRUCTIONS

## 2018-03-15 NOTE — LETTER
3/15/2018         RE: Christina Mcmahon  [id number]  [facility] [address]        Dear Colleague,    Thank you for referring your patient, Christina Mcmahon, to the [facility]. Please see a copy of my visit note below.    [facility] Dermatology Note      Dermatology Problem List:  1.Dermatofibroma        Encounter Date: Mar 15, 2018    CC:  Chief Complaint   Patient presents with     Derm Problem     New lesion on left upper arm.  Recheck lesion on hip.  Lesion on nose that patient states has grown.         History of Present Illness:  Ms. Christina Mcmahon is a 38 year old female who presents for evaluation for spot on the left upper arm that has changed and is pruritic, a lesion on the left medial knee that catches when she shaves. The patient also has a spot on the lesion on the hip, a lesions on the nose that has grown and is not bleeding, irritated, or otherwise bothersome.      Past Medical History:   Patient Active Problem List   Diagnosis     Sphincter of Oddi dysfunction     Past Medical History:   Diagnosis Date     Depressive disorder      History reviewed. No pertinent surgical history.    Social History:  The patient has several children. Father passed away    Family History:  Her father has skin cancer.     Medications:  Current Outpatient Prescriptions   Medication Sig Dispense Refill     levonorgestrel (MIRENA, 52 MG,) 20 MCG/24HR IUD 1 each by Intrauterine route once       ALPRAZolam (XANAX) 0.5 MG tablet as needed.       lamoTRIgine (LAMICTAL) 200 MG tablet TAKE 1 TABLET DAILY.  5     vitamin D (ERGOCALCIFEROL) 17178 UNIT capsule Take one capsule once a week for 8 weeks.       aspirin-acetaminophen-caffeine (EXCEDRIN MIGRAINE) 250-250-65 MG per tablet Take 1 tablet by mouth       venlafaxine (EFFEXOR-XR) 150 MG 24 hr capsule Take 150 mg by mouth       topiramate (TOPAMAX) 25 MG tablet Take 50 mg by mouth       Acetaminophen (TYLENOL PO)          Allergies    Allergen Reactions     Codeine Nausea and Vomiting       Review of Systems:  -Constitutional: The patient is feeling generally well. No fevers, chills night sweats. No changes in medical problems.   -Skin: As above in HPI. No additional skin concerns.    Physical exam:  Vitals: There were no vitals taken for this visit.  GEN: This is a well developed, well-nourished female in no acute distress, in a pleasant mood.    SKIN: Total skin excluding the undergarment areas was performed. The exam included the head/face, neck, both arms, chest, back, abdomen, both legs, digits and/or nails. decines gential exam. HAs make up on and declines removal  -approx 5mm skin colored papule on the right nose  -Skin colored pedunculated papule left axilla that is pruritic.approx 3-4mm   -There is a firm tan/flesh colored papule that dimples with lateral pressure on the left medial knee.  -No other lesions of concern on areas examined.       Impression/Plan:    1. Skin colored pedunculated papule left axilla that is pruritic. Hx of pain. NUB. DDx includes nevus vs other    Shave biopsy:  After discussion of benefits and risks including but not limited to bleeding/bruising, pain/swelling, infection, scar, incomplete removal, nerve damage/numbness, recurrence, and non-diagnostic biopsy, written consent, verbal consent and photographs were obtained. Time-out was performed. The area was cleaned with isopropyl alcohol.  was injected to obtain adequate anesthesia of the lesion on the left axilla. 0.5 ml of 1% lidocaine with 1:100,000 epinephrine was injected to obtain adequate anesthesia. A  shave biopsy was performed. Hemostasis was achieved with aluminium chloride. Vaseline and a sterile dressing were applied. The patient tolerated the procedure and no complications were noted. The patient was provided with verbal and written post care instructions.      2. Dermatofibroma, left medial knee. Hx of catching with razor    Referral to   [facility] for excision.   3. Papule consistent with nevus on the right nose    Referral to Dr. Hayden for shave removal if pt desires, this has been growing. She will consider.     4. Multiple clinically benign nevi on the trunk and extremities    No further intervention required. Patient to report changes.     Recommend sunscreens SPF #30 or greater, protective clothing and avoidance of tanning beds.         Follow-up with Dr. Hayden for excision.    Staff Involved:  Scribe/Staff    Scribe Disclosure:   I, Saskia Sanchez, am serving as a scribe to document services personally performed by Dr. Christina Ortiz, based on data collection and the provider's statements to me.       Provider Disclosure:   The documentation recorded by the scribe accurately reflects the services I personally performed and the decisions made by me.    Christina Ortiz MD    Department of Dermatology  [facility]: Phone: [phone], Fax:[phone]  [facility]: Phone: [phone], Fax: [phone]        Again, thank you for allowing me to participate in the care of your patient.        Sincerely,        Christina Ortiz MD

## 2018-03-18 ENCOUNTER — HEALTH MAINTENANCE LETTER (OUTPATIENT)
Age: 38
End: 2018-03-18

## 2018-03-19 LAB — COPATH REPORT: NORMAL

## 2018-04-12 ENCOUNTER — TELEPHONE (OUTPATIENT)
Dept: DERMATOLOGY | Facility: CLINIC | Age: 38
End: 2018-04-12

## 2018-04-12 NOTE — TELEPHONE ENCOUNTER
[facility] Call Center    Phone Message    May a detailed message be left on voicemail: yes    Reason for Call: Other: patient will be about 30 minutes late to her 1pm procedure today- she called and nobody at clinic was available- she said closer to the time she may call to r/s. she has an emergency at work     Action Taken: Message routed to:  Adult Clinics: Dermatology p [phone]

## 2018-04-12 NOTE — TELEPHONE ENCOUNTER
I left a message for patient to call [facility].    Christy Santana RN

## 2018-04-20 NOTE — TELEPHONE ENCOUNTER
Three attempts to reach patient.  Closing encounter.  Procedure was elective.  Christy Santana RN

## 2018-07-12 ENCOUNTER — OFFICE VISIT (OUTPATIENT)
Dept: DERMATOLOGY | Facility: CLINIC | Age: 38
End: 2018-07-12
Payer: COMMERCIAL

## 2018-07-12 VITALS — DIASTOLIC BLOOD PRESSURE: 78 MMHG | SYSTOLIC BLOOD PRESSURE: 121 MMHG | HEART RATE: 95 BPM | OXYGEN SATURATION: 96 %

## 2018-07-12 DIAGNOSIS — D23.72 DERMATOFIBROMA OF LEFT LOWER EXTREMITY: Primary | ICD-10-CM

## 2018-07-12 PROCEDURE — 12031 INTMD RPR S/A/T/EXT 2.5 CM/<: CPT | Mod: GC | Performed by: DERMATOLOGY

## 2018-07-12 PROCEDURE — 11402 EXC TR-EXT B9+MARG 1.1-2 CM: CPT | Mod: GC | Performed by: DERMATOLOGY

## 2018-07-12 PROCEDURE — 88305 TISSUE EXAM BY PATHOLOGIST: CPT | Mod: TC | Performed by: DERMATOLOGY

## 2018-07-12 RX ORDER — LIDOCAINE HYDROCHLORIDE AND EPINEPHRINE 10; 10 MG/ML; UG/ML
3 INJECTION, SOLUTION INFILTRATION; PERINEURAL ONCE
Qty: 5.5 ML | Refills: 0 | OUTPATIENT
Start: 2018-07-12 | End: 2018-07-12

## 2018-07-12 ASSESSMENT — PAIN SCALES - GENERAL: PAINLEVEL: NO PAIN (0)

## 2018-07-12 NOTE — PATIENT INSTRUCTIONS
Referral to Dr. Cramer, [facility], [address] [place] [id number], Phone: [phone]    Excision Wound Care Instructions  I will experience scar, altered skin color, bleeding, swelling, pain, crusting and redness. I may experience altered sensation. Risks are excessive bleeding, infection, muscle weakness, thick (hypertrophic or keloidal) scar, and recurrence,. A second procedure may be recommended to obtain the best cosmetic or functional result.  Possible complications of any surgical procedure are bleeding, infection, scarring, alteration in skin color and sensation, muscle weakness in the area, wound dehiscence or seperation, or recurrence of the lesion or disease. On occasion, after healing, a secondary procedure or revision may be recommended in order to obtain the best cosmetic or functional result.   After your surgery, a pressure bandage will be placed over the area that has sutures. This will help prevent bleeding. Please follow these instructions, as they will help you to prevent complications as your wound heals.  For the First 48 hours After Surgery:  1. Leave the pressure bandage on and keep it dry. If it should come loose, you may retape it, but do not take it off.  2. Relax and take it easy. Do not do any vigorous exercise, heavy lifting, or bending forward. This could cause the wound to bleed.  3. Post-operative pain is usually mild. You may take plain or extra strength Tylenol every 4 hours as needed (do not take more than 4,000mg in one day). Do not take any medicine that contains aspirin, ibuprofen or motrin unless you have been recommended these by a doctor.  Avoid alcohol and vitamin E as these may increase your tendency to bleed.  4. You may put an ice pack around the bandaged area for 20 minutes every 2-3 hours. This may help reduce swelling, bruising, and pain. Make sure the ice pack is waterproof so that the pressure bandage does not get wet.   5. You may  see a small amount of drainage or blood on your pressure bandage. This is normal. However, if drainage or bleeding continues or saturates the bandage, you will need to apply firm pressure over the bandage with a washcloth for 15 minutes. If bleeding continues after applying pressure for 15 minutes then go to the nearest emergency room.  48 Hours After Surgery  Carefully remove the bandage and start daily wound care and dressing changes. You may also now shower and get the wound wet. Wash wound with a mild soap and water.  Use caution when washing the wound. Be gentle and do not let the forceful shower stream hit the wound directly.  PAT dry.  Daily Wound Care:  1. Wash wound with a mild soap and water.  Use caution when washing the wound, be gentle and do not let the forceful shower stream hit the wound directly.  2. PAT DRY.  3. Apply Vaseline (from a new container or tube) over the suture line with a Q-tip. It is very important to keep the wound continuously moist, as wounds heal best in a moist environment.  4.  Keep the site covered until sutures are removed, you can cover it with a Telfa (non-stick) dressing and tape or a band-aid.    5. If you are unable to keep wound covered, you must apply Vaseline every 2 - 3 hours (while awake) to ensure it is being kept moist for optimal healing. A dressing overnight is recommended to keep the area moist.   Call Us If:  1. You have pain that is not controlled with Tylenol.  2. You have signs or symptoms of an infection, such as: fever over 100 degrees F, redness, warmth, or foul-smelling or yellow/creamy drainage from the wound.  Who should I call with questions?    [facility]: [phone]     [facility]: [phone]    For urgent needs outside of business hours call the [facility] at [phone] and ask for the dermatology resident on call

## 2018-07-12 NOTE — MR AVS SNAPSHOT
After Visit Summary   7/12/2018    Christina Mcmahon    MRN: [id number]           Patient Information     Date Of Birth          1980        Visit Information        Provider Department      7/12/2018 3:00 PM Zaheer Hayden MD [facility]        Today's Diagnoses     Dermatofibroma of left lower extremity    -  1      Care Instructions    Referral to Dr. Cramer, [facility], [address] [place] [address], Phone: [phone]    Excision Wound Care Instructions  I will experience scar, altered skin color, bleeding, swelling, pain, crusting and redness. I may experience altered sensation. Risks are excessive bleeding, infection, muscle weakness, thick (hypertrophic or keloidal) scar, and recurrence,. A second procedure may be recommended to obtain the best cosmetic or functional result.  Possible complications of any surgical procedure are bleeding, infection, scarring, alteration in skin color and sensation, muscle weakness in the area, wound dehiscence or seperation, or recurrence of the lesion or disease. On occasion, after healing, a secondary procedure or revision may be recommended in order to obtain the best cosmetic or functional result.   After your surgery, a pressure bandage will be placed over the area that has sutures. This will help prevent bleeding. Please follow these instructions, as they will help you to prevent complications as your wound heals.  For the First 48 hours After Surgery:  1. Leave the pressure bandage on and keep it dry. If it should come loose, you may retape it, but do not take it off.  2. Relax and take it easy. Do not do any vigorous exercise, heavy lifting, or bending forward. This could cause the wound to bleed.  3. Post-operative pain is usually mild. You may take plain or extra strength Tylenol every 4 hours as needed (do not take more than 4,000mg in one day). Do not take any medicine that contains aspirin,  ibuprofen or motrin unless you have been recommended these by a doctor.  Avoid alcohol and vitamin E as these may increase your tendency to bleed.  4. You may put an ice pack around the bandaged area for 20 minutes every 2-3 hours. This may help reduce swelling, bruising, and pain. Make sure the ice pack is waterproof so that the pressure bandage does not get wet.   5. You may see a small amount of drainage or blood on your pressure bandage. This is normal. However, if drainage or bleeding continues or saturates the bandage, you will need to apply firm pressure over the bandage with a washcloth for 15 minutes. If bleeding continues after applying pressure for 15 minutes then go to the nearest emergency room.  48 Hours After Surgery  Carefully remove the bandage and start daily wound care and dressing changes. You may also now shower and get the wound wet. Wash wound with a mild soap and water.  Use caution when washing the wound. Be gentle and do not let the forceful shower stream hit the wound directly.  PAT dry.  Daily Wound Care:  1. Wash wound with a mild soap and water.  Use caution when washing the wound, be gentle and do not let the forceful shower stream hit the wound directly.  2. PAT DRY.  3. Apply Vaseline (from a new container or tube) over the suture line with a Q-tip. It is very important to keep the wound continuously moist, as wounds heal best in a moist environment.  4.  Keep the site covered until sutures are removed, you can cover it with a Telfa (non-stick) dressing and tape or a band-aid.    5. If you are unable to keep wound covered, you must apply Vaseline every 2 - 3 hours (while awake) to ensure it is being kept moist for optimal healing. A dressing overnight is recommended to keep the area moist.   Call Us If:  1. You have pain that is not controlled with Tylenol.  2. You have signs or symptoms of an infection, such as: fever over 100 degrees F, redness, warmth, or foul-smelling or  yellow/creamy drainage from the wound.  Who should I call with questions?    [facility]: [phone]     [facility]: [phone]    For urgent needs outside of business hours call the [facility] at [phone] and ask for the dermatology resident on call              Follow-ups after your visit        Follow-up notes from your care team     Return in about 2 weeks (around 7/26/2018) for suture removal.      Your next 10 appointments already scheduled     Jul 26, 2018 10:30 AM CDT   Nurse Only with NURSE ONLY MG DERM   [facility]    [address] [address] [place] [id number] [phone]              Who to contact     If you have questions or need follow up information about today's clinic visit or your schedule please contact [facility] directly at [phone].  Normal or non-critical lab and imaging results will be communicated to you by [email or web address], letter or phone within 4 business days after the clinic has received the results. If you do not hear from us within 7 days, please contact the clinic through [employer] or phone. If you have a critical or abnormal lab result, we will notify you by phone as soon as possible.  Submit refill requests through [employer] or call your pharmacy and they will forward the refill request to us. Please allow 3 business days for your refill to be completed.          Additional Information About Your Visit        [email or web address] Information     [employer] gives you secure access to your electronic health record. If you see a primary care provider, you can also send messages to your care team and make appointments. If you have questions, please call your primary care clinic.  If you do not have a primary care provider, please call [phone] and they will assist you.      [employer] is an electronic gateway that provides easy, online access to your medical  records. With [employer], you can request a clinic appointment, read your test results, renew a prescription or communicate with your care team.     To access your existing account, please contact your [facility] or call [phone] for assistance.        Care EveryWhere ID     This is your Care EveryWhere ID. This could be used by other organizations to access your [facility] medical records  [id number]        Your Vitals Were     Pulse Pulse Oximetry                95 96%           Blood Pressure from Last 3 Encounters:   07/12/18 121/78   01/15/18 124/80   05/03/15 (!) 137/95    Weight from Last 3 Encounters:   01/15/18 91.6 kg (201 lb 14.4 oz)   10/14/14 85.7 kg (189 lb)              Today, you had the following     No orders found for display       Primary Care Provider Office Phone # Fax #    Jaime Jacques [phone]       [facility] [address] [place] [address]        Equal Access to Services     [facility] : Hadii aad ku hadasho Soomaali, waaxda luqadaha, qaybta kaalmada adeegyada, waxay idiin hayaan taraeg kharashahnaz andrade . So [facility] [phone].    ATENCIÓN: Si habla español, tiene a munoz disposición servicios gratuitos de asistencia lingüística. Jayaame al [phone].    We comply with applicable federal civil rights laws and [place] laws. We do not discriminate on the basis of race, color, national origin, age, disability, sex, sexual orientation, or gender identity.            Thank you!     Thank you for choosing [facility]  for your care. Our goal is always to provide you with excellent care. Hearing back from our patients is one way we can continue to improve our services. Please take a few minutes to complete the written survey that you may receive in the mail after your visit with us. Thank you!             Your Updated Medication List - Protect others around you: Learn how to safely use, store and throw  away your medicines at www.disposemymeds.org.          This list is accurate as of 7/12/18  3:41 PM.  Always use your most recent med list.                   Brand Name Dispense Instructions for use Diagnosis    ALPRAZolam 0.5 MG tablet    XANAX     as needed.        aspirin-acetaminophen-caffeine 250-250-65 MG per tablet    EXCEDRIN MIGRAINE     Take 1 tablet by mouth        lamoTRIgine 200 MG tablet    LaMICtal     TAKE 1 TABLET DAILY.        MIRENA (52 MG) 20 MCG/24HR IUD   Generic drug:  levonorgestrel      1 each by Intrauterine route once        topiramate 25 MG tablet    TOPAMAX     Take 50 mg by mouth        TYLENOL PO           venlafaxine 150 MG 24 hr capsule    EFFEXOR-XR     Take 150 mg by mouth        vitamin D 44694 UNIT capsule    ERGOCALCIFEROL     Take one capsule once a week for 8 weeks.

## 2018-07-12 NOTE — PROGRESS NOTES
DERMATOLOGY EXCISION PROCEDURE NOTE    Dermatology Problem List:  1. Dermatofibroma, L medial knee  - s/p excision 7/12/18  2. Flesh colored dome shaped papule, R alar crease   - Pt is interested in having this removed, referral to Dr. Cramer for Smoothbeam vs shave or excision with Dr. Hayden.   3. 2 mm flesh colored papule with central folliculocentric brown dot  - suspect inflamed follicle vs inflammatory papule, monitor for resolution     NAME OF PROCEDURE: Excision intermediate layered linear closure  Staff surgeon: Zaheer Hayden DO  Resident: Aneudy Humphreys MD   Scrub Nurse: Maria R Ballard LPN     PRE-OPERATIVE DIAGNOSIS:  Dermatofibroma   POST-OPERATIVE DIAGNOSIS: same   FINAL EXCISION SIZE(DEFECT SIZE): 1.1 x 1.3 cm, with 3 mm margin   FINAL REPAIR LENGTH: 2.3 cm     INDICATIONS: This patient presented with a 0.5 x 0.7 cm dermatofibroma of the left medial knee. Excision was indicated. We discussed the principles of treatment and most likely complications including scarring, bleeding, infection, incomplete excision, wound dehiscence, pain, nerve damage, and recurrence. Informed consent was obtained and the patient underwent the procedure as follows:    PROCEDURE: The patient was taken to the operative suite. Time-out was performed.  The treatment area was anesthetized with 1% lidocaine and epinephrine (1:100,000). The area was prepped with Chlorhexidine and rinsed with sterile saline and draped with sterile towels. The lesion was delineated and excised down to subcutaneous fat in a elliptical manner. Hemostasis was obtained by electrocoagulation. Suture placed at medial tip of ellipse for pathologic orientation.     REPAIR: An intermediate layered linear closure was selected as the procedure which would maximally preserve both function and cosmesis.    After the excision of the tumor, the area was carefully undermined. Hemostasis was obtained with electrocoagulation.  Closure was oriented so that the  wound was in the patient's natural skin tension lines. The subcutaneous and dermal layers were then closed with 4-0 Vicryl sutures. The epidermis was then carefully approximated along the length of the wound using 4-0 Prolene simple running sutures.     The final wound length was 2.3 cm. A total of 5.5 ml of anesthesia was administered for all surgical sites. Estimated blood loss was less than 10 ml for all surgical sites. A sterile pressure dressing was applied and wound care instructions, with a written handout, were given. The patient was [facility] alert and ambulatory.    Follow-up for suture removal in 2 weeks.       Anatomic Pathology Results: pending      Staff Involved: Zaheer Hayden DO  Resident: MD Aneudy Shepard MD  PGY4 Dermatology  [phone]     Staff Physician Comments:   I saw and evaluated the patient with the resident and I agree with the above description of the procedure.   I was physically present and scrubbed for all key portions of the procedure today. I was immediately available at all times.    Zaheer Hayden DO    Department of Dermatology  [facility]: Phone: [phone], Fax:[phone]  [facility]: Phone: [phone], Fax: [phone]

## 2018-07-12 NOTE — NURSING NOTE
Vaseline and pressure dressing applied to excision site on left medial knee.  Wound care instructions reviewed with patient and AVS provided.  Patient verbalized understanding.  Patient will follow up for suture removal on July 26, 2018.  No further questions or concerns at this time.      Maria R Ballard LPN

## 2018-07-12 NOTE — NURSING NOTE
Excision Intake  There were no vitals taken for this visit.    artificial heart valve: No    artificial joint within the last 3 years: No    heart stent in the last 3 months: No    pacemaker: No    defibrillator: No    nerve/brain stimulator: No    bleeding disorder: No    coumadin use: No    heart valve disease: No    site location lower limb or groin: Yes    hepatitis B/C or HIV: No    smoker: Yes    Iodine allergy: No         Christina Mcmahon's goals for this visit include: excision dermatofibroma left medial knee and right nose  She requests these members of her care team be copied on today's visit information:     PCP: Jaime Jacques    Referring Provider:  No referring provider defined for this encounter.    /78  Pulse 95  SpO2 96%    Do you need any medication refills at today's visit? No    Maria R Ballard LPN

## 2018-07-16 LAB — COPATH REPORT: NORMAL

## 2018-07-18 ENCOUNTER — TELEPHONE (OUTPATIENT)
Dept: DERMATOLOGY | Facility: CLINIC | Age: 38
End: 2018-07-18

## 2018-07-18 NOTE — TELEPHONE ENCOUNTER
Notes Recorded by Albertina Luque CMA on 7/18/2018 at 9:30 AM  Called patient and informed her of her results.  She stated that it is healing well and she didn't have any further questions.    Albertina Luque CMA    ------    Notes Recorded by Zaheer Hayden MD on 7/17/2018 at 5:59 PM  Please call the patient to let her know pathology results.   Dermatofibroma confirmed. It appeared to have been excised completely.   Thank you.

## 2018-07-26 ENCOUNTER — ALLIED HEALTH/NURSE VISIT (OUTPATIENT)
Dept: NURSING | Facility: CLINIC | Age: 38
End: 2018-07-26
Payer: COMMERCIAL

## 2018-07-26 DIAGNOSIS — Z48.02 VISIT FOR SUTURE REMOVAL: Primary | ICD-10-CM

## 2018-07-26 PROCEDURE — 99207 ZZC NO CHARGE NURSE ONLY: CPT

## 2018-07-26 NOTE — MR AVS SNAPSHOT
After Visit Summary   7/26/2018    Christina Mcmahon    MRN: [id number]           Patient Information     Date Of Birth          1980        Visit Information        Provider Department      7/26/2018 10:30 AM NURSE ONLY [facility]        Today's Diagnoses     Visit for suture removal    -  1       Follow-ups after your visit        Who to contact     If you have questions or need follow up information about today's clinic visit or your schedule please contact [facility] directly at [phone].  Normal or non-critical lab and imaging results will be communicated to you by Liquid Accountshart, letter or phone within 4 business days after the clinic has received the results. If you do not hear from us within 7 days, please contact the clinic through Liquid Accountshart or phone. If you have a critical or abnormal lab result, we will notify you by phone as soon as possible.  Submit refill requests through Hands or call your pharmacy and they will forward the refill request to us. Please allow 3 business days for your refill to be completed.          Additional Information About Your Visit        MyChart Information     Hands gives you secure access to your electronic health record. If you see a primary care provider, you can also send messages to your care team and make appointments. If you have questions, please call your primary care clinic.  If you do not have a primary care provider, please call [phone] and they will assist you.      Hands is an electronic gateway that provides easy, online access to your medical records. With Hands, you can request a clinic appointment, read your test results, renew a prescription or communicate with your care team.     To access your existing account, please contact your [facility] or call [phone] for assistance.        Care EveryWhere ID     This is your Care EveryWhere ID. This could be used  by other organizations to access your [facility] medical records  [id number]         Blood Pressure from Last 3 Encounters:   07/12/18 121/78   01/15/18 124/80   05/03/15 (!) 137/95    Weight from Last 3 Encounters:   01/15/18 91.6 kg (201 lb 14.4 oz)   10/14/14 85.7 kg (189 lb)              Today, you had the following     No orders found for display       Primary Care Provider Office Phone # Fax #    Jaime Jacques [phone]       [facility] [address] [place] [address]        Equal Access to Services     [facility]: Hadii aad ku hadasho Soomaali, waaxda luqadaha, qaybta kaalmada adeegyada, yovany andrade . So [facility] [phone].    ATENCIÓN: Si habla español, tiene a munoz disposición servicios gratuitos de asistencia lingüística. Llame al [phone].    We comply with applicable federal civil rights laws and [place] laws. We do not discriminate on the basis of race, color, national origin, age, disability, sex, sexual orientation, or gender identity.            Thank you!     Thank you for choosing [facility]  for your care. Our goal is always to provide you with excellent care. Hearing back from our patients is one way we can continue to improve our services. Please take a few minutes to complete the written survey that you may receive in the mail after your visit with us. Thank you!             Your Updated Medication List - Protect others around you: Learn how to safely use, store and throw away your medicines at www.disposemymeds.org.          This list is accurate as of 7/26/18 10:48 AM.  Always use your most recent med list.                   Brand Name Dispense Instructions for use Diagnosis    ALPRAZolam 0.5 MG tablet    XANAX     as needed.        aspirin-acetaminophen-caffeine 250-250-65 MG per tablet    EXCEDRIN MIGRAINE     Take 1 tablet by mouth        lamoTRIgine 200 MG tablet    LaMICtal     TAKE 1 TABLET  DAILY.        MIRENA (52 MG) 20 MCG/24HR IUD   Generic drug:  levonorgestrel      1 each by Intrauterine route once        topiramate 25 MG tablet    TOPAMAX     Take 50 mg by mouth        TYLENOL PO           venlafaxine 150 MG 24 hr capsule    EFFEXOR-XR     Take 150 mg by mouth        vitamin D 59733 UNIT capsule    ERGOCALCIFEROL     Take one capsule once a week for 8 weeks.

## 2018-07-26 NOTE — NURSING NOTE
Dermatology Suture Removal Record  S: Christina presents to the clinic today for  removal of sutures.    The patient has had the sutures in place for 14 days.    There has been no history of infection or drainage.    O: Sutures are seen located on the left knee.  The wound is healing well with no signs of infection.    A: Suture removal.    P:  All sutures were easily removed today.  Routine wound care discussed.  The patient will follow up as needed.    This service provided today was under the supervising provider of the day Dr. Hayden, who was available if needed.      Sarina Fenton LPN

## 2018-10-05 ENCOUNTER — MYC MEDICAL ADVICE (OUTPATIENT)
Dept: FAMILY MEDICINE | Facility: CLINIC | Age: 38
End: 2018-10-05

## 2018-10-05 ENCOUNTER — OFFICE VISIT (OUTPATIENT)
Dept: FAMILY MEDICINE | Facility: CLINIC | Age: 38
End: 2018-10-05
Payer: COMMERCIAL

## 2018-10-05 VITALS
RESPIRATION RATE: 18 BRPM | TEMPERATURE: 99.6 F | HEIGHT: 66 IN | WEIGHT: 204.3 LBS | DIASTOLIC BLOOD PRESSURE: 82 MMHG | HEART RATE: 80 BPM | SYSTOLIC BLOOD PRESSURE: 110 MMHG | BODY MASS INDEX: 32.83 KG/M2 | OXYGEN SATURATION: 95 %

## 2018-10-05 DIAGNOSIS — B96.89 BACTERIAL VAGINITIS: ICD-10-CM

## 2018-10-05 DIAGNOSIS — F10.10 ALCOHOL ABUSE: ICD-10-CM

## 2018-10-05 DIAGNOSIS — B08.1 MOLLUSCUM CONTAGIOSUM: ICD-10-CM

## 2018-10-05 DIAGNOSIS — N89.8 VAGINAL LESION: Primary | ICD-10-CM

## 2018-10-05 DIAGNOSIS — N76.0 BACTERIAL VAGINITIS: ICD-10-CM

## 2018-10-05 DIAGNOSIS — F32.1 MODERATE MAJOR DEPRESSION (H): ICD-10-CM

## 2018-10-05 DIAGNOSIS — Z11.3 SCREEN FOR STD (SEXUALLY TRANSMITTED DISEASE): ICD-10-CM

## 2018-10-05 LAB
SPECIMEN SOURCE: ABNORMAL
WET PREP SPEC: ABNORMAL

## 2018-10-05 PROCEDURE — 87529 HSV DNA AMP PROBE: CPT | Mod: 59 | Performed by: NURSE PRACTITIONER

## 2018-10-05 PROCEDURE — 87591 N.GONORRHOEAE DNA AMP PROB: CPT | Performed by: NURSE PRACTITIONER

## 2018-10-05 PROCEDURE — 87491 CHLMYD TRACH DNA AMP PROBE: CPT | Performed by: NURSE PRACTITIONER

## 2018-10-05 PROCEDURE — 99214 OFFICE O/P EST MOD 30 MIN: CPT | Performed by: NURSE PRACTITIONER

## 2018-10-05 PROCEDURE — 87529 HSV DNA AMP PROBE: CPT | Performed by: NURSE PRACTITIONER

## 2018-10-05 PROCEDURE — 87210 SMEAR WET MOUNT SALINE/INK: CPT | Performed by: NURSE PRACTITIONER

## 2018-10-05 RX ORDER — VALACYCLOVIR HYDROCHLORIDE 1 G/1
1000 TABLET, FILM COATED ORAL 2 TIMES DAILY
Qty: 20 TABLET | Refills: 0 | Status: SHIPPED | OUTPATIENT
Start: 2018-10-05

## 2018-10-05 RX ORDER — METRONIDAZOLE 7.5 MG/G
1 GEL VAGINAL AT BEDTIME
Qty: 70 G | Refills: 0 | Status: SHIPPED | OUTPATIENT
Start: 2018-10-05

## 2018-10-05 ASSESSMENT — PAIN SCALES - GENERAL: PAINLEVEL: NO PAIN (0)

## 2018-10-05 NOTE — MR AVS SNAPSHOT
After Visit Summary   10/5/2018    Christina Mcmahon    MRN: [id number]           Patient Information     Date Of Birth          1980        Visit Information        Provider Department      10/5/2018 11:40 AM Valerie Jo APRN CNP [facility]        Today's Diagnoses     Vaginal lesion    -  1    Screen for STD (sexually transmitted disease)        Molluscum contagiosum        Alcohol abuse        Moderate major depression (H)        Bacterial vaginitis           Follow-ups after your visit        Follow-up notes from your care team     Return in about 3 months (around 1/5/2019) for Lab Work.      Who to contact     If you have questions or need follow up information about today's clinic visit or your schedule please contact [facility] directly at [phone].  Normal or non-critical lab and imaging results will be communicated to you by MyChart, letter or phone within 4 business days after the clinic has received the results. If you do not hear from us within 7 days, please contact the clinic through Spacecomhart or phone. If you have a critical or abnormal lab result, we will notify you by phone as soon as possible.  Submit refill requests through PanAtlanta or call your pharmacy and they will forward the refill request to us. Please allow 3 business days for your refill to be completed.          Additional Information About Your Visit        MyChart Information     PanAtlanta gives you secure access to your electronic health record. If you see a primary care provider, you can also send messages to your care team and make appointments. If you have questions, please call your primary care clinic.  If you do not have a primary care provider, please call [phone] and they will assist you.        Care EveryWhere ID     This is your Care EveryWhere ID. This could be used by other organizations to access your [facility] medical records  [id number]        Your Vitals Were     Pulse  "Temperature Respirations Height Pulse Oximetry BMI (Body Mass Index)    80 99.6  F (37.6  C) (Temporal) 18 5' 6\" (1.676 m) 95% 32.97 kg/m2       Blood Pressure from Last 3 Encounters:   10/05/18 110/82   07/12/18 121/78   01/15/18 124/80    Weight from Last 3 Encounters:   10/05/18 204 lb 4.8 oz (92.7 kg)   01/15/18 201 lb 14.4 oz (91.6 kg)   10/14/14 189 lb (85.7 kg)              We Performed the Following     Chlamydia trachomatis PCR     HSV 1 and 2 DNA by PCR     Neisseria gonorrhoeae PCR     Wet prep          Today's Medication Changes          These changes are accurate as of 10/5/18 11:59 PM.  If you have any questions, ask your nurse or doctor.               Start taking these medicines.        Dose/Directions    metroNIDAZOLE 0.75 % vaginal gel   Commonly known as:  METROGEL-VAGINAL   Used for:  Bacterial vaginitis   Started by:  Valerie Jo APRN CNP        Dose:  1 applicator   Place 1 applicator (5 g) vaginally At Bedtime   Quantity:  70 g   Refills:  0       valACYclovir 1000 mg tablet   Commonly known as:  VALTREX   Used for:  Vaginal lesion   Started by:  Valerie Jo APRN CNP        Dose:  1000 mg   Take 1 tablet (1,000 mg) by mouth 2 times daily   Quantity:  20 tablet   Refills:  0            Where to get your medicines      These medications were sent to [place] [address] [facility] [place] - [address]  [address] [facility] [place] [address]     Phone:  [phone]     metroNIDAZOLE 0.75 % vaginal gel    valACYclovir 1000 mg tablet                Primary Care Provider Office Phone # Fax #    Jaime Jacques [phone]       [facility] [address] [place] [address]        Equal Access to Services     [facility]: Randy Aggarwal, wahortencia mojica, qaybta kaalmajovanny colvin, yovany maurice. So [facility] [phone].    ATENCIÓN: Si habla español, tiene a munoz disposición servicios gratuitos de asistencia " lingüística. Romy al [phone].    We comply with applicable federal civil rights laws and [place] laws. We do not discriminate on the basis of race, color, national origin, age, disability, sex, sexual orientation, or gender identity.            Thank you!     Thank you for choosing [facility]  for your care. Our goal is always to provide you with excellent care. Hearing back from our patients is one way we can continue to improve our services. Please take a few minutes to complete the written survey that you may receive in the mail after your visit with us. Thank you!             Your Updated Medication List - Protect others around you: Learn how to safely use, store and throw away your medicines at www.disposemymeds.org.          This list is accurate as of 10/5/18 11:59 PM.  Always use your most recent med list.                   Brand Name Dispense Instructions for use Diagnosis    ALPRAZolam 0.5 MG tablet    XANAX     as needed.        aspirin-acetaminophen-caffeine 250-250-65 MG per tablet    EXCEDRIN MIGRAINE     Take 1 tablet by mouth        lamoTRIgine 200 MG tablet    LaMICtal     TAKE 1 TABLET DAILY.        metroNIDAZOLE 0.75 % vaginal gel    METROGEL-VAGINAL    70 g    Place 1 applicator (5 g) vaginally At Bedtime    Bacterial vaginitis       MIRENA (52 MG) 20 MCG/24HR IUD   Generic drug:  levonorgestrel      1 each by Intrauterine route once        topiramate 25 MG tablet    TOPAMAX     Take 50 mg by mouth        TYLENOL PO           valACYclovir 1000 mg tablet    VALTREX    20 tablet    Take 1 tablet (1,000 mg) by mouth 2 times daily    Vaginal lesion       venlafaxine 150 MG 24 hr capsule    EFFEXOR-XR     Take 150 mg by mouth        vitamin D 37939 UNIT capsule    ERGOCALCIFEROL     Take one capsule once a week for 8 weeks.

## 2018-10-05 NOTE — PROGRESS NOTES
"  SUBJECTIVE:   Christina Mcmahon is a 38 year old female who presents to clinic today for the following health issues:    STD     History of Present Illness     Diet:  Regular (no restrictions)  Frequency of exercise:  2-3 days/week  Duration of exercise:  15-30 minutes  Taking medications regularly:  Yes  Medication side effects:  Not applicable  Additional concerns today:  No    Answers for HPI/ROS submitted by the patient on 10/5/2018   PHQ-2 Score: 2    The patient reports to the clinic for an STD screen as she noticed a few \"bumpy, itchy\" spots in her left vaginal area two days ago.  She relates that she did have one bump the night of intercourse that she attributed to shaving, but then more have appeared and are itchy now. She denies any vaginal discharge. She has not had any other STDs other than chlamydia. She states that it was not a sexual assault. She reports that they were just drinking and slept with a stranger and does not know much a bout him as it was a \"one time thing.\"    She reports that once every couple weeks that she is getting pretty intoxicated to where her judgement is impaired, but not where she is \"blacking out.\" She declined any treatment options for her alcohol use as she \"chooses to drink that way,\" but states that she is starting a new job on Monday, 10/8/18, and so will be cutting back as \"it isn't an option.\" She does not currently have a psychiatrist and states that she had not seen her in a while before she left the office she was at.    Denies burning sensation of vaginal bumps, abdominal pain, nausea, vomiting, increase in urinary frequency, previous lesion or abrasion in vaginal area due to shaving, or dysuria.      Problem list and histories reviewed & adjusted, as indicated.  Additional history: as documented    Patient Active Problem List   Diagnosis     Sphincter of Oddi dysfunction     Moderate major depression (H)     Alcohol abuse     Past Surgical History:   Procedure " Laterality Date     AS ERCP W SPHINCTEROTOMY       COLPOSCOPY       GALLBLADDER SURGERY       PANCREAS SURGERY       UPPER GI ENDOSCOPY      X 2     wisdom teeth         Social History   Substance Use Topics     Smoking status: Current Some Day Smoker     Packs/day: 0.25     Types: Cigarettes     Smokeless tobacco: Never Used      Comment: 5  cigs daily      Alcohol use Yes      Comment: socially  - weekly 4 -6 drinks      Family History   Problem Relation Age of Onset     Hypertension Father      Cancer Father      multiple myeloma     Breast Cancer Maternal Aunt          Current Outpatient Prescriptions   Medication Sig Dispense Refill     levonorgestrel (MIRENA, 52 MG,) 20 MCG/24HR IUD 1 each by Intrauterine route once       metroNIDAZOLE (METROGEL-VAGINAL) 0.75 % vaginal gel Place 1 applicator (5 g) vaginally At Bedtime 70 g 0     valACYclovir (VALTREX) 1000 mg tablet Take 1 tablet (1,000 mg) by mouth 2 times daily 20 tablet 0     vitamin D (ERGOCALCIFEROL) 43034 UNIT capsule Take one capsule once a week for 8 weeks.       Acetaminophen (TYLENOL PO)        ALPRAZolam (XANAX) 0.5 MG tablet as needed.       aspirin-acetaminophen-caffeine (EXCEDRIN MIGRAINE) 250-250-65 MG per tablet Take 1 tablet by mouth       lamoTRIgine (LAMICTAL) 200 MG tablet TAKE 1 TABLET DAILY.  5     topiramate (TOPAMAX) 25 MG tablet Take 50 mg by mouth       venlafaxine (EFFEXOR-XR) 150 MG 24 hr capsule Take 150 mg by mouth       Allergies   Allergen Reactions     Codeine Nausea and Vomiting     Recent Labs   Lab Test  03/09/11   0714  03/08/11   0801   ALT  38  44   CR  0.76  0.77   GFRESTIMATED  89  87   GFRESTBLACK  >90  >90   POTASSIUM  3.9  4.7      BP Readings from Last 3 Encounters:   10/05/18 110/82   07/12/18 121/78   01/15/18 124/80    Wt Readings from Last 3 Encounters:   10/05/18 204 lb 4.8 oz (92.7 kg)   01/15/18 201 lb 14.4 oz (91.6 kg)   10/14/14 189 lb (85.7 kg)        ROS:  Constitutional, HEENT, cardiovascular, pulmonary,  "GI, , musculoskeletal, neuro, skin, endocrine and psych systems are negative, except as otherwise noted.    This document serves as a record of the services and decisions personally performed and made by Valerie Jo CNP. It was created on his/her behalf by Vero Ramirez, trained medical scribe. The creation of this document is based the provider's statements to the medical scribes.    Scribtiffanie Ramirez 11:54 AM, October 5, 2018  OBJECTIVE:     /82  Pulse 80  Temp 99.6  F (37.6  C) (Temporal)  Resp 18  Ht 5' 6\" (1.676 m)  Wt 204 lb 4.8 oz (92.7 kg)  SpO2 95%  BMI 32.97 kg/m2  Body mass index is 32.97 kg/(m^2).     GENERAL: healthy, alert and no distress   (female): normal female external genitalia- see below, normal urethral meatus, vaginal mucosa, normal cervix/adnexa without masses or discharge , IUD strings present  SKIN: On inner fold on the left outer labia there are two 3-4mm ovoid lesions, however they are excoriated and am not able discern a viral versus excoriation/other at this point, on upper thighs and mons region there are four to five  2mm skin/pink colored molluscum lesions which are doughnut shaped and depressed center   PSYCH: mentation appears normal, affect stressed, insight limited, adequate eye contact, normal grooming.     Diagnostic Test Results:  No results found for this or any previous visit (from the past 24 hour(s)).    ASSESSMENT/PLAN:       ICD-10-CM    1. Vaginal lesion N89.8 HSV 1 and 2 DNA by PCR     valACYclovir (VALTREX) 1000 mg tablet   2. Screen for STD (sexually transmitted disease) Z11.3 Neisseria gonorrhoeae PCR     Chlamydia trachomatis PCR     Wet prep     Hepatitis C antibody     Hepatitis B Surface Antibody     HIV Antigen Antibody Combo     Treponema Abs w Reflex to RPR and Titer     CANCELED: Hepatitis C antibody     CANCELED: HIV Antigen Antibody Combo     CANCELED: Treponema Abs w Reflex to RPR and Titer     CANCELED: Hepatitis B Surface Antibody " "  3. Molluscum contagiosum B08.1    4. Alcohol abuse F10.10    5. Moderate major depression (H) F32.1    6. Bacterial vaginitis N76.0 metroNIDAZOLE (METROGEL-VAGINAL) 0.75 % vaginal gel    B96.89       STD screening labs placed today due to itchy vaginal sores after sexual intercourse with partner with unknown history; will notify with results. Discussed condom use with IUD and risks- pt. States she is aware.    Plan to treat with Valtrex 1000 mg and will make needed changes upon lab results. Discussed poss HSV and initial outbreak, if has- is not \"curable\". Given resource for [email or web address]    Discussed molluscum etiology, treatment options, and outcomes.  Pt. Opting for home cares/treatment.  Discussed transmission/resolution as well.     Discussed abstaining from alcohol use from weekly intoxication to point where judgement is impaired. Offered treatment for this.  Patient declined any assistance with alcohol use and was adamant that she was fine even if she is \"textbook\".  She stated she had a \"shit year\", my dad  and he was my \"person. Stated she was offended to be recommended treatment, as I didn't know her. She states her psychiatrist is gone, but is able to transfer care in the clinic she was being seen at. I \"choose to congnisicantly drink that much\", when asked why, she states that \"because marijuana isn't legal\". \"I know it's bad, but it is my choice how I am choosing to drink.\" Stated she didn't feel safe here because all she wanted was an STD screen and felt judged about her alcohol use and \"didn't ask for that\" when she made the appointment. Then stated she was at a work trip and at a function around people she didn't know very well and drank too much causing the impaired judgement. Empathized with her situation, and recommended she continue with her therapist and deeply look at her self-medication and keep up with psychiatry, but that I felt my opinion was accurate and was not changing my " recommendation. Discussed role of primary care is whole care, and that is routinely part of our care.    She says she will continue care at [facility].    Follow up with PCP in 3 months for repeat HIV screening lab or prn.      The information in this document, created by the medical scribe for me, accurately reflects the services I personally performed and the decisions made by me. I have reviewed and approved this document for accuracy prior to leaving the patient care area.  Valerie Jo CNP  11:54 AM, 10/05/18    ESTEFANI Hein CNP  [facility]

## 2018-10-06 LAB
HSV1 DNA SPEC QL NAA+PROBE: NEGATIVE
HSV2 DNA SPEC QL NAA+PROBE: NEGATIVE
SPECIMEN SOURCE: NORMAL

## 2019-02-22 ENCOUNTER — VIRTUAL VISIT (OUTPATIENT)
Dept: FAMILY MEDICINE | Facility: OTHER | Age: 39
End: 2019-02-22

## 2019-02-22 NOTE — PROGRESS NOTES
"Date:   Clinician: Kale Rivas  Clinician NPI: [id number]  Patient: Christina Mcmahon  Patient : 1980  Patient Address: [address] [place] [address], [place] [address]  Patient Phone: [phone]  Visit Protocol: STD  Patient Summary:  Christina is a 39 year old ( : 1980 ) female who initiated a Visit for sexually transmitted diseases (STDs). When asked the question \"Please sign me up to receive news, health information and promotions. \", Christina responded \"No\".   Christina started this Visit because someone she has had sex with has been diagnosed with chlamydia.   Symptom details  Christina does not have any symptoms she worries are caused by an STD.   Precipitating events  Christina is currently in a sexual relationship. It has been less than 2 months since Christina has had sex with someone other than her current partner. She is unsure if her partner has had sex with someone else since their relationship started.   In the past 2 months, Christina has had more than 1 sexual partner. She has been sexually active with men.   Pertinent medical history  Christina has been diagnosed with chlamydia. She was diagnosed with chlamydia more than 12 months ago and completed treatment for it.   Christina does not smoke or use smokeless tobacco.   She denies pregnancy and denies breastfeeding. She does not menstruate.   MEDICATIONS: Mirena intrauterine, ALLERGIES: Fiorinal-Codeine #3  Clinician Response:  Dear Christina,  Your health is our priority. To determine the most appropriate care for you, I would like you to be seen in person to further discuss your health history.  You will not be charged for this Visit. Thank you for trusting us with your care.   Diagnosis: Refer for additional evaluation  Diagnosis ICD: R69  Diagnosis ICD: 462.0  "

## 2019-11-09 ENCOUNTER — HEALTH MAINTENANCE LETTER (OUTPATIENT)
Age: 39
End: 2019-11-09

## 2021-10-19 PROBLEM — F32.9 MAJOR DEPRESSION: Status: ACTIVE | Noted: 2018-10-05

## 2025-01-07 ENCOUNTER — LAB REQUISITION (OUTPATIENT)
Dept: LAB | Facility: CLINIC | Age: 45
End: 2025-01-07

## 2025-01-07 DIAGNOSIS — Z12.4 ENCOUNTER FOR SCREENING FOR MALIGNANT NEOPLASM OF CERVIX: ICD-10-CM

## 2025-01-07 PROCEDURE — 87624 HPV HI-RISK TYP POOLED RSLT: CPT | Performed by: OBSTETRICS & GYNECOLOGY

## 2025-01-09 LAB
HPV HR 12 DNA CVX QL NAA+PROBE: NEGATIVE
HPV16 DNA CVX QL NAA+PROBE: NEGATIVE
HPV18 DNA CVX QL NAA+PROBE: NEGATIVE
HUMAN PAPILLOMA VIRUS FINAL DIAGNOSIS: NORMAL